# Patient Record
Sex: MALE | Race: WHITE
[De-identification: names, ages, dates, MRNs, and addresses within clinical notes are randomized per-mention and may not be internally consistent; named-entity substitution may affect disease eponyms.]

---

## 2017-08-23 ENCOUNTER — HOSPITAL ENCOUNTER (OUTPATIENT)
Dept: HOSPITAL 47 - ORWHC2ENDO | Age: 32
Discharge: HOME | End: 2017-08-23
Payer: COMMERCIAL

## 2017-08-23 VITALS — BODY MASS INDEX: 39.5 KG/M2

## 2017-08-23 VITALS — RESPIRATION RATE: 16 BRPM | TEMPERATURE: 97 F

## 2017-08-23 VITALS — HEART RATE: 78 BPM | SYSTOLIC BLOOD PRESSURE: 104 MMHG | DIASTOLIC BLOOD PRESSURE: 70 MMHG

## 2017-08-23 DIAGNOSIS — Z87.19: ICD-10-CM

## 2017-08-23 DIAGNOSIS — Z79.899: ICD-10-CM

## 2017-08-23 DIAGNOSIS — Z12.11: Primary | ICD-10-CM

## 2017-08-23 PROCEDURE — 88305 TISSUE EXAM BY PATHOLOGIST: CPT

## 2017-08-23 PROCEDURE — 45380 COLONOSCOPY AND BIOPSY: CPT

## 2017-08-23 NOTE — P.PCN
Date of Procedure: 08/23/17


Preoperative Diagnosis: 





Postoperative Diagnosis: 





Procedure(s) Performed: 


BRIEF HISTORY: Patient is a 32-year-old pleasant white male, scheduled for an 

elective colonoscopy as a part of screening for colorectal neoplasia.  He does 

have long-standing history of ulcerative colitis diagnosed in 2004 but had 

remained in clinical remission.  In fact for the last 10 years has not been on 

any maintenance medications.





PROCEDURE PERFORMED: Colonoscopy with biopsy. 





PREOPERATIVE DIAGNOSIS: Screening for colon cancer/long-standing history of 

ulcerative colitis. 





IV sedation per Anesthesia. 





PROCEDURE: After informed consent was obtained, the patient, was brought into 

the endoscopy unit. IV sedation was administered by Anesthesia under continuous 

monitoring.  Digital rectal examination was normal. Initially the Olympus CF-

160 flexible video colonoscope was then inserted in the rectum, gradually 

advanced into the cecum without any difficulty. Careful examination was 

performed as the scope was gradually being withdrawn. Ileocecal valve and the 

appendiceal orifice were visualized and appeared normal.  Prep was excellent. 

Mucosa of the cecum, ascending colon, transverse colon, descending colon, 

sigmoid colon, and rectum appeared normal.  Random biopsies were done from 

different areas of the colon to rule out dysplasia.  Retroflexion was performed 

in the rectum and no lesions were seen. The patient tolerated the procedure 

well. 





IMPRESSION: 


Normal-appearing colon from rectum to cecum with no evidence of active colitis 

or colorectal neoplasia.





RECOMMENDATIONS:  Findings of this examination were discussed with the patient 

as well as his family.  He was advised to follow with the biopsy results.  If 

the biopsy does not show any evidence of dysplasia, he can have a repeat 

colonoscopy in 2-3 years.





Implants: 





Indications for Procedure: 





Operative Findings: 





Description of Procedure:

## 2017-11-07 ENCOUNTER — HOSPITAL ENCOUNTER (OUTPATIENT)
Dept: HOSPITAL 47 - SLEEP | Age: 32
Discharge: HOME | End: 2017-11-07
Payer: COMMERCIAL

## 2017-11-07 DIAGNOSIS — R53.82: ICD-10-CM

## 2017-11-07 DIAGNOSIS — M26.29: ICD-10-CM

## 2017-11-07 DIAGNOSIS — R06.83: Primary | ICD-10-CM

## 2017-11-07 PROCEDURE — 99211 OFF/OP EST MAY X REQ PHY/QHP: CPT

## 2017-11-07 NOTE — CONS
CONSULTATION



Consultation note for sleep apnea.



PRIMARY CARE PHYSICIAN:

Dr. Cecilia Krishnamurthy.



A 32-year-old male patient,  who works in [employer] and lives in

[place].  The patient has been feeling tired lately and there was a concern of sleep

apnea knowing that he snores and he is waking up tired despite averaging around 6-7

hours of sleep.  He is going to bed around 11 o'clock and wakes up 4:50 a.m. in the

morning.  Upon driving back and forth to [place], [place] the patient does not fall

asleep in his car nor has he been involved in a motor vehicle accidents because of

sleepiness.  He seems to be more fatigued and sleepy. His [place] score is at 3.  No

recent weight gain.  No other cardiovascular disease.  No history of head trauma.  No

history of any recent weight gain.  He has maintained his weight.  In fact, he has lost

some weight over the past 5 years.



PAST MEDICAL HISTORY:

Meniere disease.



PAST SURGICAL HISTORY:

Includes _____ sac decompression in addition to a lump removed from the left testicle

that turned out to be benign.



ALLERGIES:

BANANAS.  No medication allergies.



FAMILY HISTORY:

The father has obstructive sleep apnea.  He utilizes CPAP therapy.



SOCIAL HISTORY:

Negative for alcohol. No history of IV drugs.  No history of smoking.



OUTPATIENT MEDICATIONS:

Triamterene 1 tablet a day.



REVIEW OF SYSTEMS:

12-point review of system was done.  Positive findings are mentioned above in history

of present illness.  No history of insomnia. No choking or gasping sensation at

nighttime. No grinding of the teeth.  No sleepwalking. He does wake up with a dry

mouth.  He has occasional panic attacks.  No sleep talking. No sweating. No

palpitation.  No heartburn. No sexual dysfunction.  He has lost around 25-30 pounds

since 5 years.



PHYSICAL EXAMINATION:

BP is 121/78, pulse 92, respirations 16, temperature 98.1, saturation 98% on room air.

Weight is 304, height is 6 feet 1 inch.  Neck size 17-1/4 of an inch.  BMI is 40.1.

GENERAL APPEARANCE: Calm, comfortable.

HEENT: Negative for JVD. No goiter or neck masses.

LUNGS:  Clear to auscultation.

HEART:  Sounds regular rhythm.  Normal S1, S2.  No S3.  No murmurs.

ABDOMEN:  Soft, nontender.  No organomegaly.

EXTREMITIES:  No edema.  No cyanosis or clubbing.



IMPRESSION:

1. Obstructive sleep apnea suspected on clinical grounds.  Currently under

    investigation.

2. Chronic fatigue.

3. Limited sleepiness [place] score is at 3.

4. Overbite.



PLAN:

Screening polysomnogram will be ordered and will decide other treatment accordingly.





JOSE / NOAN: [id number] / Job#: [id number]

## 2018-05-08 ENCOUNTER — HOSPITAL ENCOUNTER (OUTPATIENT)
Dept: HOSPITAL 47 - SLEEP | Age: 33
Discharge: HOME | End: 2018-05-08
Payer: COMMERCIAL

## 2018-05-08 DIAGNOSIS — R53.83: ICD-10-CM

## 2018-05-08 DIAGNOSIS — G47.33: Primary | ICD-10-CM

## 2018-05-08 DIAGNOSIS — M26.29: ICD-10-CM

## 2018-05-08 DIAGNOSIS — Z99.89: ICD-10-CM

## 2018-05-08 DIAGNOSIS — E66.9: ICD-10-CM

## 2018-08-29 ENCOUNTER — HOSPITAL ENCOUNTER (OUTPATIENT)
Dept: HOSPITAL 47 - SLEEP | Age: 33
Discharge: HOME | End: 2018-08-29
Attending: INTERNAL MEDICINE
Payer: COMMERCIAL

## 2018-08-29 DIAGNOSIS — G47.33: Primary | ICD-10-CM

## 2018-08-29 DIAGNOSIS — M26.29: ICD-10-CM

## 2018-08-29 DIAGNOSIS — E66.9: ICD-10-CM

## 2018-08-29 DIAGNOSIS — Z99.89: ICD-10-CM

## 2018-08-29 NOTE — PN
PROGRESS NOTE



This patient is coming in for a compliancy check. During his last presentation

approximately 3 months ago I made some adjustments. I gave him the AirFit N20 nose mask

and I also asked him to be more committed to CPAP therapy knowing that the patient has

severe obstructive sleep apnea and his AHI is 38.  On today's evaluation, the patient

is feeling better.  His compliance is improved. His clinical response is improved. He

is waking up much more refreshed and alert during the day.  The snoring is completely

subsided.  The patient seems to be looking much better.  He is averaging about 4.9

hours of CPAP use per night and his compliance data that shows that the patient has

been utilizing his CPAP more than 4 hours approximately 70% of the time.  Leak factor

is 28 L per minute, AHI while on treatment is down to 2.3.  He has lost 3 pounds.  He

goes to bed around 11 o'clock, wakes up 5 in the morning and he needs to put more

number of hours of sleep as the patient may have an underlying component of

insufficient sleep syndrome.



PHYSICAL EXAMINATION:

BP is 106/63, pulse 90, respirations 16, temperature is 97, saturation 99% on room air.

Weight is 301, height is 6 feet 1 inch. Lamar score 6, BMI 39.4.

GENERAL APPEARANCE:  Calm, comfortable. Head is atraumatic, normocephalic.

NECK:  Supple.  There is no JVD.  No goiter or neck masses.

LUNGS:  Clear to auscultation.

HEART:  Sounds regular rhythm.  Normal S1, S2.  No S3.  No murmurs.

ABDOMEN:  Soft, nontender.  No organomegaly.

EXTREMITIES:  No edema.  No cyanosis or clubbing.

SKIN:  Negative for any wounds or ulceration.



IMPRESSION:

1. Symptomatic obstructive sleep apnea, AHI of 38 currently on CPAP pressure of 7 cm

    of water.  The patient is demonstrating good clinical response and compliance.

    Clinically improved.  The patient's compliance data also showed marked improvement

    in his average number of hours of CPAP use.

2. Hypersomnia, improved.

3. Sleepiness improved.

4. Obesity with a body mass index of 40, trying to lose weight.

5. Overbite.



PLAN:

1. Continue CPAP therapy at same level of pressure.

2. Encourage weight loss.

3. Treatment is successful compliance data is improved. Will see back in a year's time

    or earlier if needed.





MMODL / IJN: [id number] / Job#: [id number]

## 2025-06-06 ENCOUNTER — HOSPITAL ENCOUNTER (EMERGENCY)
Dept: HOSPITAL 47 - EC | Age: 40
Discharge: HOME | End: 2025-06-06
Payer: COMMERCIAL

## 2025-06-06 VITALS — RESPIRATION RATE: 18 BRPM | TEMPERATURE: 98.1 F

## 2025-06-06 VITALS — HEART RATE: 90 BPM | SYSTOLIC BLOOD PRESSURE: 115 MMHG | DIASTOLIC BLOOD PRESSURE: 76 MMHG

## 2025-06-06 DIAGNOSIS — K92.1: Primary | ICD-10-CM

## 2025-06-06 DIAGNOSIS — Z87.19: ICD-10-CM

## 2025-06-06 LAB
ALBUMIN SERPL-MCNC: 4.5 G/DL (ref 3.5–5)
ALP SERPL-CCNC: 90 U/L (ref 38–126)
ALT SERPL-CCNC: 104 U/L (ref 4–49)
ANION GAP SERPL CALC-SCNC: 14 MMOL/L
ANISOCYTOSIS BLD QL SMEAR: (no result)
APTT BLD: 23.2 SEC (ref 22–30)
AST SERPL-CCNC: 59 U/L (ref 17–59)
BASO STIPL BLD QL SMEAR: (no result)
BASOPHILS # BLD AUTO: 0.04 10*3/UL (ref 0–0.1)
BASOPHILS NFR BLD AUTO: 0.4 %
BILIRUB BLD-MCNC: 1 MG/DL (ref 0.2–1.3)
BUN SERPL-SCNC: 15 MG/DL (ref 9–20)
BURR CELLS BLD QL SMEAR: (no result)
CALCIUM SPEC-MCNC: 9.4 MG/DL (ref 8.4–10.2)
CHLORIDE SERPL-SCNC: 97 MMOL/L (ref 98–107)
CO2 SERPL-SCNC: 26 MMOL/L (ref 22–30)
CREATININE: 0.91 MG/DL (ref 0.66–1.25)
DACRYOCYTES BLD QL SMEAR: (no result)
DOHLE BOD BLD QL SMEAR: (no result)
EOSINOPHIL # BLD AUTO: 0.2 10*3/UL (ref 0.04–0.35)
EOSINOPHIL NFR BLD AUTO: 2.1 %
ERYTHROCYTE [DISTWIDTH] IN BLOOD BY AUTOMATED COUNT: 5.17 10*6/UL (ref 4.4–5.6)
ERYTHROCYTE [DISTWIDTH] IN BLOOD: 12.3 % (ref 11.5–14.5)
GLUCOSE SERPL-MCNC: 95 MG/DL (ref 74–99)
HCT VFR BLD AUTO: 46.4 % (ref 39.6–50)
HGB BLD-MCNC: 16.5 G/DL (ref 13–17)
HOWELL-JOLLY BOD BLD QL SMEAR: (no result)
HYPOCHROMIA BLD QL SMEAR: (no result)
IMM GRANULOCYTES # BLD: 0.06 10*3/UL (ref 0–0.04)
INR PPP: 1 (ref ?–1.2)
LG PLATELETS BLD QL SMEAR: (no result)
LYMPHOCYTES # SPEC AUTO: 2.95 10*3/UL (ref 0.9–5)
LYMPHOCYTES NFR SPEC AUTO: 31.5 %
Lab: (no result)
MCH RBC QN AUTO: 31.9 PG (ref 27–32)
MCHC RBC AUTO-ENTMCNC: 35.6 G/DL (ref 32–37)
MCV RBC AUTO: 89.7 FL (ref 80–97)
MONOCYTES # BLD AUTO: 0.68 10*3/UL (ref 0.2–1)
MONOCYTES NFR BLD AUTO: 7.3 %
NEUTROPHILS # BLD AUTO: 5.44 10*3/UL (ref 1.8–7.7)
NEUTROPHILS NFR BLD AUTO: 58.1 %
NEUTS HYPERSEG # BLD: (no result) 10*3/UL
NRBC BLD AUTO-RTO: (no result) %
OVALOCYTES BLD QL SMEAR: (no result)
PELGER HUET CELLS BLD QL SMEAR: (no result)
PLATELET # BLD AUTO: 284 10*3/UL (ref 140–440)
PLATELETS.RETICULATED NFR BLD AUTO: (no result) %
PMV BLD AUTO: 8.8 FL (ref 9.5–12.2)
POIKILOCYTOSIS BLD QL SMEAR: (no result)
POIKILOCYTOSIS BLD QL SMEAR: (no result)
POLYCHROMASIA BLD QL SMEAR: (no result)
POTASSIUM SERPL-SCNC: 3.9 MMOL/L (ref 3.5–5.1)
PROT SERPL-MCNC: 7.2 G/DL (ref 6.3–8.2)
PT BLD: 11 SEC (ref 10–12.5)
RBC MORPH BLD: (no result)
ROULEAUX BLD QL SMEAR: (no result)
SCHISTOCYTES BLD QL SMEAR: (no result)
SICKLE CELLS BLD QL SMEAR: (no result)
SODIUM SERPL-SCNC: 137 MMOL/L (ref 137–145)
SPHEROCYTES BLD QL SMEAR: (no result)
STOMATOCYTES BLD QL SMEAR: (no result)
TARGETS BLD QL SMEAR: (no result)
TOXIC GRANULES BLD QL SMEAR: (no result)
VARIANT LYMPHS BLD QL SMEAR: (no result)
WBC # BLD AUTO: 9.37 10*3/UL (ref 4.5–10)
WBC NRBC COR # BLD: (no result) K/UL
WBC TOXIC VACUOLES BLD QL SMEAR: (no result)

## 2025-06-06 PROCEDURE — 99284 EMERGENCY DEPT VISIT MOD MDM: CPT

## 2025-06-06 PROCEDURE — 85730 THROMBOPLASTIN TIME PARTIAL: CPT

## 2025-06-06 PROCEDURE — 36415 COLL VENOUS BLD VENIPUNCTURE: CPT

## 2025-06-06 PROCEDURE — 80053 COMPREHEN METABOLIC PANEL: CPT

## 2025-06-06 PROCEDURE — 85025 COMPLETE CBC W/AUTO DIFF WBC: CPT

## 2025-06-06 PROCEDURE — 85610 PROTHROMBIN TIME: CPT
